# Patient Record
Sex: FEMALE | Race: BLACK OR AFRICAN AMERICAN | NOT HISPANIC OR LATINO | URBAN - METROPOLITAN AREA
[De-identification: names, ages, dates, MRNs, and addresses within clinical notes are randomized per-mention and may not be internally consistent; named-entity substitution may affect disease eponyms.]

---

## 2017-11-12 ENCOUNTER — EMERGENCY (EMERGENCY)
Facility: HOSPITAL | Age: 27
LOS: 1 days | Discharge: HOME | End: 2017-11-12
Attending: EMERGENCY MEDICINE | Admitting: EMERGENCY MEDICINE
Payer: COMMERCIAL

## 2017-11-12 VITALS
WEIGHT: 207.9 LBS | HEART RATE: 59 BPM | TEMPERATURE: 98 F | OXYGEN SATURATION: 98 % | DIASTOLIC BLOOD PRESSURE: 83 MMHG | SYSTOLIC BLOOD PRESSURE: 157 MMHG | RESPIRATION RATE: 18 BRPM | HEIGHT: 62 IN

## 2017-11-12 DIAGNOSIS — Z91.018 ALLERGY TO OTHER FOODS: ICD-10-CM

## 2017-11-12 DIAGNOSIS — F17.200 NICOTINE DEPENDENCE, UNSPECIFIED, UNCOMPLICATED: ICD-10-CM

## 2017-11-12 DIAGNOSIS — K08.89 OTHER SPECIFIED DISORDERS OF TEETH AND SUPPORTING STRUCTURES: ICD-10-CM

## 2017-11-12 DIAGNOSIS — Z88.0 ALLERGY STATUS TO PENICILLIN: ICD-10-CM

## 2017-11-12 DIAGNOSIS — Z91.010 ALLERGY TO PEANUTS: ICD-10-CM

## 2017-11-12 DIAGNOSIS — Z20.2 CONTACT WITH AND (SUSPECTED) EXPOSURE TO INFECTIONS WITH A PREDOMINANTLY SEXUAL MODE OF TRANSMISSION: ICD-10-CM

## 2017-11-12 LAB
APPEARANCE UR: CLEAR — SIGNIFICANT CHANGE UP
BILIRUB UR-MCNC: NEGATIVE — SIGNIFICANT CHANGE UP
COLOR SPEC: YELLOW — SIGNIFICANT CHANGE UP
DIFF PNL FLD: (no result)
GLUCOSE UR QL: NEGATIVE — SIGNIFICANT CHANGE UP
HCG UR QL: POSITIVE — SIGNIFICANT CHANGE UP
HIV 1+2 AB+HIV1 P24 AG SERPL QL IA: SIGNIFICANT CHANGE UP
KETONES UR-MCNC: >=80 MG/DL
LEUKOCYTE ESTERASE UR-ACNC: NEGATIVE — SIGNIFICANT CHANGE UP
NITRITE UR-MCNC: NEGATIVE — SIGNIFICANT CHANGE UP
PH UR: 6 — SIGNIFICANT CHANGE UP (ref 5–8)
PROT UR-MCNC: NEGATIVE MG/DL — SIGNIFICANT CHANGE UP
SP GR SPEC: 1.01 — SIGNIFICANT CHANGE UP (ref 1–1.03)
UROBILINOGEN FLD QL: 0.2 E.U./DL — SIGNIFICANT CHANGE UP

## 2017-11-12 PROCEDURE — 81001 URINALYSIS AUTO W/SCOPE: CPT

## 2017-11-12 PROCEDURE — 36415 COLL VENOUS BLD VENIPUNCTURE: CPT

## 2017-11-12 PROCEDURE — 99283 EMERGENCY DEPT VISIT LOW MDM: CPT | Mod: 25

## 2017-11-12 PROCEDURE — 81025 URINE PREGNANCY TEST: CPT

## 2017-11-12 PROCEDURE — 87389 HIV-1 AG W/HIV-1&-2 AB AG IA: CPT

## 2017-11-12 PROCEDURE — 99284 EMERGENCY DEPT VISIT MOD MDM: CPT

## 2017-11-12 PROCEDURE — 96372 THER/PROPH/DIAG INJ SC/IM: CPT

## 2017-11-12 RX ORDER — CEFTRIAXONE 500 MG/1
250 INJECTION, POWDER, FOR SOLUTION INTRAMUSCULAR; INTRAVENOUS ONCE
Qty: 0 | Refills: 0 | Status: COMPLETED | OUTPATIENT
Start: 2017-11-12 | End: 2017-11-12

## 2017-11-12 RX ORDER — ACETAMINOPHEN WITH CODEINE 300MG-30MG
1 TABLET ORAL ONCE
Qty: 0 | Refills: 0 | Status: DISCONTINUED | OUTPATIENT
Start: 2017-11-12 | End: 2017-11-12

## 2017-11-12 RX ORDER — AZITHROMYCIN 500 MG/1
1000 TABLET, FILM COATED ORAL ONCE
Qty: 0 | Refills: 0 | Status: COMPLETED | OUTPATIENT
Start: 2017-11-12 | End: 2017-11-12

## 2017-11-12 RX ORDER — ACETAMINOPHEN 500 MG
1 TABLET ORAL
Qty: 12 | Refills: 0 | OUTPATIENT
Start: 2017-11-12 | End: 2017-11-15

## 2017-11-12 RX ADMIN — CEFTRIAXONE 250 MILLIGRAM(S): 500 INJECTION, POWDER, FOR SOLUTION INTRAMUSCULAR; INTRAVENOUS at 19:13

## 2017-11-12 RX ADMIN — AZITHROMYCIN 1000 MILLIGRAM(S): 500 TABLET, FILM COATED ORAL at 19:16

## 2017-11-12 RX ADMIN — Medication 1 TABLET(S): at 17:46

## 2017-11-12 NOTE — ED PROVIDER NOTE - TOOTH FINDINGS
left lower molar # 19 + erosive/DENTAL CARIES DENTAL CARIES/left lower molar # 19 + erosive, No palpable abscess , no swelling, no facial swelling or erythema.

## 2017-11-12 NOTE — ED PROVIDER NOTE - MEDICAL DECISION MAKING DETAILS
Patient with + tooth decay, with concerns of STDs. STD screening was done  in light of newly discovered pregnancy status in ED today, will treat empirically for STDs. Patient in high risk for exposure due to current life style. Patient was observed for reactions with abx therapy due to PCN allergies which reaction is a rash. No reactions occurred. Strongly recommend gyn follow up. Avoid alcohol, NSAID. Tylenol PRN. Follow up with dentist.

## 2017-11-12 NOTE — ED PROVIDER NOTE - OBJECTIVE STATEMENT
26 y/o f with no pmh presents to ED c/o toothache for 3 day. Also claims to have un protective sex with multiple partners and would like to be screen for STD and HIV. She admit to toothache to be recurrent and causing to have severe pain unrelieved with Ibuprofen 800mg. Denies fever, discharge, eating something that crack her tooth, no facial swelling. Patient report to have no vaginal symptoms, discharge or dysuria.

## 2017-11-14 LAB
C TRACH RRNA SPEC QL NAA+PROBE: SIGNIFICANT CHANGE UP
N GONORRHOEA RRNA SPEC QL NAA+PROBE: SIGNIFICANT CHANGE UP
SPECIMEN SOURCE: SIGNIFICANT CHANGE UP

## 2018-02-24 ENCOUNTER — EMERGENCY (EMERGENCY)
Facility: HOSPITAL | Age: 28
LOS: 1 days | Discharge: HOME | End: 2018-02-24
Admitting: EMERGENCY MEDICINE
Payer: COMMERCIAL

## 2018-02-24 VITALS — HEIGHT: 62 IN | WEIGHT: 205.03 LBS

## 2018-02-24 VITALS
HEIGHT: 62 IN | RESPIRATION RATE: 18 BRPM | SYSTOLIC BLOOD PRESSURE: 135 MMHG | HEART RATE: 73 BPM | TEMPERATURE: 98 F | DIASTOLIC BLOOD PRESSURE: 78 MMHG | OXYGEN SATURATION: 100 % | WEIGHT: 205.03 LBS

## 2018-02-24 DIAGNOSIS — Z79.899 OTHER LONG TERM (CURRENT) DRUG THERAPY: ICD-10-CM

## 2018-02-24 DIAGNOSIS — Z91.010 ALLERGY TO PEANUTS: ICD-10-CM

## 2018-02-24 DIAGNOSIS — Z91.018 ALLERGY TO OTHER FOODS: ICD-10-CM

## 2018-02-24 DIAGNOSIS — H92.02 OTALGIA, LEFT EAR: ICD-10-CM

## 2018-02-24 DIAGNOSIS — Z88.0 ALLERGY STATUS TO PENICILLIN: ICD-10-CM

## 2018-02-24 DIAGNOSIS — J45.909 UNSPECIFIED ASTHMA, UNCOMPLICATED: ICD-10-CM

## 2018-02-24 DIAGNOSIS — H66.92 OTITIS MEDIA, UNSPECIFIED, LEFT EAR: ICD-10-CM

## 2018-02-24 LAB — HIV 1+2 AB+HIV1 P24 AG SERPL QL IA: SIGNIFICANT CHANGE UP

## 2018-02-24 PROCEDURE — 87389 HIV-1 AG W/HIV-1&-2 AB AG IA: CPT

## 2018-02-24 PROCEDURE — 99283 EMERGENCY DEPT VISIT LOW MDM: CPT

## 2018-02-24 PROCEDURE — 99284 EMERGENCY DEPT VISIT MOD MDM: CPT | Mod: 25

## 2018-02-24 PROCEDURE — 36415 COLL VENOUS BLD VENIPUNCTURE: CPT

## 2018-02-24 RX ORDER — IBUPROFEN 200 MG
600 TABLET ORAL ONCE
Qty: 0 | Refills: 0 | Status: COMPLETED | OUTPATIENT
Start: 2018-02-24 | End: 2018-02-24

## 2018-02-24 RX ADMIN — Medication 600 MILLIGRAM(S): at 03:38

## 2018-02-24 NOTE — ED ADULT NURSE NOTE - CHPI ED SYMPTOMS NEG
no numbness/no weakness/no loss of consciousness/no nausea/no syncope/no blurred vision/no change in level of consciousness/no chills/no fever/no vomiting

## 2018-02-24 NOTE — ED PROVIDER NOTE - OBJECTIVE STATEMENT
28 yo female in the Er c/o left ear pain for the past 2-3 days. pt reports that she was cleaning her ears with q-tips few days ago, pain started after. Pt also mentioned that she recently had cold/flu-like symptoms , but everything is much better now. denies fever or chills. pt cud not sleep tonight due to sharp left ear pain.

## 2018-02-24 NOTE — ED PROVIDER NOTE - MEDICAL DECISION MAKING DETAILS
well appearing 28 yo female with intense  left earache. findings suspicious for acute otitis media. will d/c home with abx and recommend to f/u with ENT for re-evaluation.

## 2018-02-24 NOTE — ED PROVIDER NOTE - ENMT, MLM
Airway patent, Nasal mucosa clear. Mouth with normal mucosa. Throat has no vesicles, no oropharyngeal exudates and uvula is midline.  left ear canal findings consistent with acute otitis media/externa- erythema, bulging TM, edema,

## 2018-02-24 NOTE — ED PROVIDER NOTE - ENMT NEGATIVE STATEMENT, MLM
see HPI  .Nose: no nasal congestion and no nasal drainage.Mouth/Throat: no dysphagia, no hoarseness and no throat pain.Neck: no lumps, no pain, no stiffness and no swollen glands.

## 2018-07-12 ENCOUNTER — EMERGENCY (EMERGENCY)
Facility: HOSPITAL | Age: 28
LOS: 1 days | Discharge: HOME | End: 2018-07-12
Attending: EMERGENCY MEDICINE | Admitting: EMERGENCY MEDICINE
Payer: MEDICAID

## 2018-07-12 VITALS
SYSTOLIC BLOOD PRESSURE: 140 MMHG | OXYGEN SATURATION: 100 % | HEART RATE: 76 BPM | TEMPERATURE: 99 F | RESPIRATION RATE: 15 BRPM | DIASTOLIC BLOOD PRESSURE: 78 MMHG

## 2018-07-12 LAB
HIV COMBO RESULT: SIGNIFICANT CHANGE UP
HIV1+2 AB SPEC QL: SIGNIFICANT CHANGE UP

## 2018-07-12 PROCEDURE — 99283 EMERGENCY DEPT VISIT LOW MDM: CPT | Mod: 25

## 2018-07-12 PROCEDURE — 99053 MED SERV 10PM-8AM 24 HR FAC: CPT

## 2018-07-12 RX ORDER — AZITHROMYCIN 500 MG/1
2000 TABLET, FILM COATED ORAL ONCE
Qty: 0 | Refills: 0 | Status: COMPLETED | OUTPATIENT
Start: 2018-07-12 | End: 2018-07-12

## 2018-07-12 NOTE — ED PROVIDER NOTE - OBJECTIVE STATEMENT
[place] att: 28F neg pmh c/o rt ear pain, dysuria, and wants sti check. Past few days rt ear pain. Denies f, c, hearing changes. Dysuria neg hematuria or vag discharge. Concerned for sti, opts for empiric txt, has pcn allegy.

## 2018-07-12 NOTE — ED ADULT TRIAGE NOTE - CHIEF COMPLAINT QUOTE
Pt c/o R ear pain and burning upon urination for the past two days.  Requesting for STD check as well.  pt appears comfortable.  denies any PMHx.

## 2018-07-12 NOTE — ED PROVIDER NOTE - MEDICAL DECISION MAKING DETAILS
STI check, empiric txt given pcn allergy give azithro 2gm po, upreg send ugc  Otitis externa, will send cipro otic

## 2018-07-12 NOTE — ED PROVIDER NOTE - CARE PLAN
Principal Discharge DX:	Acute swimmer's ear of right side  Secondary Diagnosis:	STI (sexually transmitted infection)

## 2018-07-13 LAB
APPEARANCE UR: CLEAR — SIGNIFICANT CHANGE UP
BILIRUB UR-MCNC: NEGATIVE — SIGNIFICANT CHANGE UP
BLOOD UR QL VISUAL: NEGATIVE — SIGNIFICANT CHANGE UP
C TRACH RRNA SPEC QL NAA+PROBE: SIGNIFICANT CHANGE UP
COLOR SPEC: YELLOW — SIGNIFICANT CHANGE UP
GLUCOSE UR-MCNC: NEGATIVE — SIGNIFICANT CHANGE UP
KETONES UR-MCNC: NEGATIVE — SIGNIFICANT CHANGE UP
LEUKOCYTE ESTERASE UR-ACNC: HIGH
MUCOUS THREADS # UR AUTO: SIGNIFICANT CHANGE UP
N GONORRHOEA RRNA SPEC QL NAA+PROBE: SIGNIFICANT CHANGE UP
N GONORRHOEA RRNA SPEC QL NAA+PROBE: SIGNIFICANT CHANGE UP
NITRITE UR-MCNC: NEGATIVE — SIGNIFICANT CHANGE UP
PH UR: 6.5 — SIGNIFICANT CHANGE UP (ref 4.6–8)
PROT UR-MCNC: 10 MG/DL — SIGNIFICANT CHANGE UP
RBC CASTS # UR COMP ASSIST: SIGNIFICANT CHANGE UP (ref 0–?)
SP GR SPEC: 1.02 — SIGNIFICANT CHANGE UP (ref 1–1.04)
SPECIMEN SOURCE: SIGNIFICANT CHANGE UP
SPECIMEN SOURCE: SIGNIFICANT CHANGE UP
SQUAMOUS # UR AUTO: SIGNIFICANT CHANGE UP
UROBILINOGEN FLD QL: NORMAL MG/DL — SIGNIFICANT CHANGE UP
WBC UR QL: HIGH (ref 0–?)

## 2018-07-13 RX ORDER — CIPROFLOXACIN 6 MG/ML
1 SUSPENSION INTRATYMPANIC
Qty: 1 | Refills: 0 | OUTPATIENT
Start: 2018-07-13 | End: 2018-07-19

## 2018-07-13 RX ADMIN — AZITHROMYCIN 2000 MILLIGRAM(S): 500 TABLET, FILM COATED ORAL at 00:13

## 2018-07-14 LAB
BACTERIA UR CULT: SIGNIFICANT CHANGE UP
SPECIMEN SOURCE: SIGNIFICANT CHANGE UP

## 2018-07-17 ENCOUNTER — EMERGENCY (EMERGENCY)
Facility: HOSPITAL | Age: 28
LOS: 1 days | Discharge: HOME | End: 2018-07-17
Attending: EMERGENCY MEDICINE | Admitting: EMERGENCY MEDICINE
Payer: MEDICAID

## 2018-07-17 VITALS
DIASTOLIC BLOOD PRESSURE: 70 MMHG | OXYGEN SATURATION: 100 % | TEMPERATURE: 98 F | HEART RATE: 70 BPM | SYSTOLIC BLOOD PRESSURE: 125 MMHG | RESPIRATION RATE: 18 BRPM

## 2018-07-17 PROCEDURE — 99283 EMERGENCY DEPT VISIT LOW MDM: CPT

## 2018-07-17 RX ORDER — ALBUTEROL 90 UG/1
2 AEROSOL, METERED ORAL
Qty: 1 | Refills: 0 | OUTPATIENT
Start: 2018-07-17 | End: 2018-08-15

## 2018-07-17 RX ORDER — HYDROCORTISONE 1 %
1 OINTMENT (GRAM) TOPICAL
Qty: 1 | Refills: 0 | OUTPATIENT
Start: 2018-07-17 | End: 2018-07-26

## 2018-07-17 RX ORDER — CIPROFLOXACIN 6 MG/ML
1 SUSPENSION INTRATYMPANIC
Qty: 1 | Refills: 0 | OUTPATIENT
Start: 2018-07-17 | End: 2018-07-23

## 2018-07-17 NOTE — ED PROVIDER NOTE - CARE PLAN
Principal Discharge DX:	Rash  Assessment and plan of treatment:	1. USE HYDROCORTISONE AS DIRECTED FOR RASH.  2. FOLLOW UP WITH DERMATOLOGY AS DIRECTED--CALL [phone] TO MAKE AN APPOINTMENT.  3. FOLLOW UP WITH OBGYN AS DIRECTED--CALL [phone] TO MAKE AN APPOINTMENT.  4. RETURN TO THE ER FOR ANY CONCERNS.

## 2018-07-17 NOTE — ED PROVIDER NOTE - PHYSICAL EXAMINATION
skin fold below both breasts: mildly hyperpigmented with small patches of rough/dry skin; no discharge or erythema to suggest cellulitis or abscess  superior gluteal fold: mildly hyperpigmented with patche of rough/dry skin; no discharge or erythema to suggest cellulitis; no abscess/pilonidal cyst skin fold below both breasts: mildly hyperpigmented with small patches of rough/dry skin; no discharge or erythema to suggest cellulitis or abscess  superior gluteal fold: mildly hyperpigmented with patche of rough/dry skin; no discharge or erythema to suggest cellulitis; no abscess/pilonidal cyst  Chaperone WENCESLAO Hamm

## 2018-07-17 NOTE — ED PROVIDER NOTE - OBJECTIVE STATEMENT
27 yo female with asthma (states she has not used albuterol since childhood), eczema, in ED requesting treatment for eczema.  Pt states that she has eczema most prominently to area beneath breasts, between breasts and at superior gluteal fold.  Symptoms have been present intermittently for 1 year but worse recently due to humidity.  No fever, discharge, CP/SOB, N/V/D or abdominal pain.  Pt was seen in the ED last week for STD check (negative) and also received Rx cipro ear drops for ear pain but she has not picked up Rx yet.

## 2018-07-17 NOTE — ED PROVIDER NOTE - MEDICAL DECISION MAKING DETAILS
hyperpigmentation to skin fold beneath breasts and gluteal fold; no signs of abscess or cellulitis, consider eczema (which pt has); not extensive enough to warrant PO steroids but will give topical hydrocortisone and information for derm follow up; pt requesting that I re-print STD testing results from recent visit, give OBGYN follow up information to discuss birth control and re-send Rx from last visit (Cipro otic), as well as send new Rx albuterol to have at home in case needed in the future

## 2018-07-17 NOTE — ED PROVIDER NOTE - PLAN OF CARE
1. USE HYDROCORTISONE AS DIRECTED FOR RASH.  2. FOLLOW UP WITH DERMATOLOGY AS DIRECTED--CALL [phone] TO MAKE AN APPOINTMENT.  3. FOLLOW UP WITH OBGYN AS DIRECTED--CALL [phone] TO MAKE AN APPOINTMENT.  4. RETURN TO THE ER FOR ANY CONCERNS.
No

## 2018-07-18 PROBLEM — J45.909 UNSPECIFIED ASTHMA, UNCOMPLICATED: Chronic | Status: ACTIVE | Noted: 2018-02-24

## 2018-07-18 PROBLEM — H92.09 OTALGIA, UNSPECIFIED EAR: Chronic | Status: ACTIVE | Noted: 2018-02-24

## 2019-01-21 NOTE — ED ADULT NURSE NOTE - NSSISCREENINGQ5_ED_A_ED
Rishabh Chand in [facility] see children number [phone] was provided to patient mom.   She was also provided with [facility] number at [phone]. Mom will call and update with any referrals needed.      No

## 2021-12-01 NOTE — ED ADULT NURSE NOTE - DISCHARGE DATE/TIME
12-Nov-2017 19:56 High Dose Vitamin A Counseling: Side effects reviewed, pt to contact office should one occur.

## 2022-09-27 NOTE — ED PROVIDER NOTE - CONSTITUTIONAL, MLM
normal... Well appearing, well nourished, awake, alert, oriented to person, place, time/situation and in no apparent distress. Biopsy Method: double edge Personna blade

## 2025-01-20 NOTE — ED ADULT TRIAGE NOTE - TEMPERATURE IN FAHRENHEIT (DEGREES F)
What Is The Reason For Today's Visit?: Full Body Skin Examination
What Is The Reason For Today's Visit? (Being Monitored For X): concerning skin lesions on an annual basis
99

## 2025-06-07 NOTE — ED ADULT NURSE NOTE - PAIN RATING/NUMBER SCALE (0-10): ACTIVITY
Current Issues/Problems reviewed on call:  Provider Outreach Outcome and Case Closure.    Details for Interventions/Education completed on call:   No response received to request for Provider Outreach Assistance. Supervisor approved case closure. Case closed (Closure reason: Patient Declined Services).    Time Frame for Follow up:  No future follow up needed due to Case closed.    Plan for Next Call: N/A    Care Plan Reviewed with: N/A    5 Yes